# Patient Record
Sex: FEMALE | Race: WHITE
[De-identification: names, ages, dates, MRNs, and addresses within clinical notes are randomized per-mention and may not be internally consistent; named-entity substitution may affect disease eponyms.]

---

## 2022-07-10 ENCOUNTER — HOSPITAL ENCOUNTER (EMERGENCY)
Dept: HOSPITAL 97 - ER | Age: 52
Discharge: HOME | End: 2022-07-10
Payer: COMMERCIAL

## 2022-07-10 VITALS — TEMPERATURE: 98.3 F

## 2022-07-10 VITALS — DIASTOLIC BLOOD PRESSURE: 63 MMHG | OXYGEN SATURATION: 97 % | SYSTOLIC BLOOD PRESSURE: 114 MMHG

## 2022-07-10 DIAGNOSIS — S91.331A: Primary | ICD-10-CM

## 2022-07-10 DIAGNOSIS — Z88.2: ICD-10-CM
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
## 2022-07-10 LAB
ALBUMIN SERPL BCP-MCNC: 3.7 G/DL (ref 3.4–5)
ALP SERPL-CCNC: 120 U/L (ref 45–117)
ALT SERPL W P-5'-P-CCNC: 30 U/L (ref 12–78)
AST SERPL W P-5'-P-CCNC: 19 U/L (ref 15–37)
BUN BLD-MCNC: 15 MG/DL (ref 7–18)
GLUCOSE SERPLBLD-MCNC: 121 MG/DL (ref 74–106)
HCT VFR BLD CALC: 36.1 % (ref 36–45)
LYMPHOCYTES # SPEC AUTO: 1.5 K/UL (ref 0.7–4.9)
MCV RBC: 78.7 FL (ref 80–100)
PMV BLD: 7.7 FL (ref 7.6–11.3)
POTASSIUM SERPL-SCNC: 3.7 MMOL/L (ref 3.5–5.1)
RBC # BLD: 4.59 M/UL (ref 3.86–4.86)

## 2022-07-10 PROCEDURE — 99284 EMERGENCY DEPT VISIT MOD MDM: CPT

## 2022-07-10 PROCEDURE — 85025 COMPLETE CBC W/AUTO DIFF WBC: CPT

## 2022-07-10 PROCEDURE — 80053 COMPREHEN METABOLIC PANEL: CPT

## 2022-07-10 PROCEDURE — 96361 HYDRATE IV INFUSION ADD-ON: CPT

## 2022-07-10 PROCEDURE — 73630 X-RAY EXAM OF FOOT: CPT

## 2022-07-10 PROCEDURE — 96374 THER/PROPH/DIAG INJ IV PUSH: CPT

## 2022-07-10 PROCEDURE — 36415 COLL VENOUS BLD VENIPUNCTURE: CPT

## 2022-07-10 PROCEDURE — 90714 TD VACC NO PRESV 7 YRS+ IM: CPT

## 2022-07-10 PROCEDURE — 96375 TX/PRO/DX INJ NEW DRUG ADDON: CPT

## 2022-07-10 NOTE — RAD REPORT
EXAM DESCRIPTION:  RAD - Foot Right 3 View - 7/10/2022 9:43 pm

 

CLINICAL HISTORY:  PAIN

 

COMPARISON:  No comparisons

 

FINDINGS:  No fracture, dislocation or foreign body. Large plantar calcaneal spur.

## 2022-07-10 NOTE — ER
Nurse's Notes                                                                                     

 [facility]                                                                 

Name: Braxton Jain                                                                             

Age: 52 yrs                                                                                       

Sex: Female                                                                                       

: 1970                                                                                   

MRN: [id number]                                                                                   

Arrival Date: 07/10/2022                                                                          

Time: 21:04                                                                                       

Account#: [id number]                                                                            

Bed 23                                                                                            

Private MD:                                                                                       

Diagnosis: Puncture wound without foreign body, right foot-Sting Ray                              

                                                                                                  

Presentation:                                                                                     

07/10                                                                                             

21:31 Chief complaint: Patient states: PATIENT REPORTS BEING STUNG BY A STING RAY ON RIGHT    1 

      LATERAL FOOT AROUND 630 THIS EVENING. Coronavirus screen: Vaccine status: Patient           

      reports receiving the 2nd dose of the covid vaccine. Client denies travel out of the        

      [place] in the last 14 days. At this time, the client does not indicate any symptoms           

      associated with coronavirus-19. Ebola Screen: Patient negative for fever greater than       

      or equal to 101.5 degrees Fahrenheit, and additional compatible Ebola Virus Disease         

      symptoms. Initial Sepsis Screen: Does the patient meet any 2 criteria? HR > 90 bpm. No.     

      Patient's initial sepsis screen is negative. Does the patient have a suspected source       

      of infection? No. Patient's initial sepsis screen is negative.                              

21:31 Method Of Arrival: Wheelchair                                                           [facility] 

21:31 Risk Assessment: Do you want to hurt yourself or someone else? Patient reports no       [facility] 

      desire to harm self or others. Onset of symptoms was July 10, 2022 at 18:30.                

21:31 Acuity: IVÁN 3                                                                           [facility] 

                                                                                                  

Triage Assessment:                                                                                

21:33 General: Appears in no apparent distress. uncomfortable, Behavior is calm, cooperative, [facility] 

      appropriate for age. Pain: Complains of pain in right foot.                                 

                                                                                                  

OB/GYN:                                                                                           

21:33 LMP N/A - Post-menopause                                                                [facility] 

                                                                                                  

Historical:                                                                                       

- Allergies:                                                                                      

21:33 Sulfa (Sulfonamide Antibiotics);                                                        [facility] 

- Home Meds:                                                                                      

21:33 Myrbetriq 50 mg oral Tb24 1 tab once daily [Active];                                    [facility] 

- PMHx:                                                                                           

21:33 None;                                                                                   [facility] 

- PSHx:                                                                                           

21:33 Tonsillectomy; Cholecystectomy; CYST REMOVAL FROM OVARY; GASTRIC BYPASS;                [facility] 

                                                                                                  

- Immunization history:: Adult Immunizations up to date, Last tetanus immunization: up            

  to date.                                                                                        

- Social history:: Smoking status: Patient denies any tobacco usage or history of.                

- Family history:: not pertinent.                                                                 

                                                                                                  

                                                                                                  

Screenin:35 Abuse screen: Denies threats or abuse. Nutritional screening: No deficits noted.        [facility] 

      Tuberculosis screening: No symptoms or risk factors identified. Fall Risk None              

      identified.                                                                                 

                                                                                                  

Assessment:                                                                                       

21:35 Reassessment: No changes from previously documented assessment.                         [facility] 

                                                                                                  

Vital Signs:                                                                                      

21:31  / 76; Pulse 93; Resp 20; Temp 98.3(O); Pulse Ox 99% on R/A; Weight 140.61 kg;    [facility] 

      Height 5 ft. 4 in. (162.56 cm); Pain 10/10;                                                 

22:48  / 63; Pulse 88; Resp 20; Pulse Ox 97% on R/A;                                    1 

21:31 Body Mass Index 53.21 (140.61 kg, 162.56 cm)                                            [facility] 

                                                                                                  

ED Course:                                                                                        

21:04 Patient arrived in ED.                                                                  as  

21:13 Nick Lindsay MD is Attending Physician.                                             [facility] 

21:13 Shahnaz Archibald, RN is Primary Nurse.                                                    [facility] 

21:30 Comprehensive Metabolic Panel Sent.                                                     [facility] 

21:30 CBC with Diff Sent.                                                                     [facility] 

21:33 Triage completed.                                                                       [facility] 

21:33 Arm band placed on right wrist.                                                         [facility] 

21:35 No apparent distress. Awaiting lab results, Awaiting radiology results. Awaiting ED     [facility] 

      provider evaluation.                                                                        

21:35 No provider procedures requiring assistance completed. Inserted saline lock: 20 gauge   bh1 

      in right antecubital area, using aseptic technique. Blood collected.                        

21:35 Patient has correct armband on for positive identification. Placed in gown. Bed in low  bh1 

      position. Call light in reach. Side rails up X 1. Pulse ox on. NIBP on.                     

21:44 Foot Right 3 View XRAY In Process Unspecified.                                          [facility]

22:48 No apparent distress. Resting quietly. Awaiting disposition.                            [facility] 

22:50 Devan Brown MD is Referral Physician.                                               [facility] 

23:07 IV discontinued, intact, bleeding controlled, No redness/swelling at site.              [facility] 

                                                                                                  

Administered Medications:                                                                         

21:29 Drug: NS 0.9% 1000 ml Route: IV; Rate: 1 bolus; Site: left antecubital;                 [facility] 

23:07 Follow up: IV Status: Completed infusion; IV Intake: 1000ml                             [facility] 

21:30 Not Given (Patient Refused; had in 

      Provide Vaccine Information Statement (VIS).                                                

21:30 Drug: morphine 4 mg Route: IVP; Infused Over: 4 mins; Site: right antecubital;          [facility] 

21:46 Follow up: Response: No change in condition                                             [facility] 

21:30 Drug: Zofran (Ondansetron) 4 mg Route: IVP; Site: right antecubital;                    [facility] 

21:46 Follow up: Response: No adverse reaction                                                [facility] 

21:30 Drug: Doxycycline 200 mg Route: PO;                                                     [facility] 

21:47 Follow up: Response: No adverse reaction                                                [facility] 

                                                                                                  

                                                                                                  

Medication:                                                                                       

21:35 VIS not applicable for this client.                                                     [facility] 

                                                                                                  

Intake:                                                                                           

23:07 IV: 1000ml; Total: 1000ml.                                                              [facility] 

                                                                                                  

Outcome:                                                                                          

22:48 Discharged to home ambulatory.                                                          [facility] 

22:52 Discharge ordered by MD. murphy 

23:06 Condition: good                                                                         [facility] 

23:06 Discharge instructions given to patient, family, Instructed on discharge instructions,      

      follow up and referral plans. medication usage, Demonstrated understanding of               

      instructions, follow-up care, medications, Prescriptions given X 2.                         

23:07 Patient left the ED.                                                                    [facility] 

                                                                                                  

Signatures:                                                                                       

Dispatcher [employer]                           Nick Lozano MD MD cha Martinez, Amelia as Hicks, Barbara, RN                      RN   [facility]                                                  

                                                                                                  

**************************************************************************************************

## 2022-07-10 NOTE — EDPHYS
Physician Documentation                                                                           

 [facility]                                                                 

Name: Braxton Jain                                                                             

Age: 52 yrs                                                                                       

Sex: Female                                                                                       

: 1970                                                                                   

MRN: [id number]                                                                                   

Arrival Date: 07/10/2022                                                                          

Time: 21:04                                                                                       

Account#: [id number]                                                                            

Bed 23                                                                                            

Private MD:                                                                                       

ED Physician Nick Lindsay                                                                      

HPI:                                                                                              

07/10                                                                                             

22:45 This 52 yrs old  Female presents to ER via Wheelchair with complaints of       katherine 

      Puncture Wound To Foot - stingray.                                                          

22:45 The patient presents with decreased range of motion, pain, that is acute. The           katherine 

      complaints affect the right foot. Context: [place]          

      resulted from sting ray. Onset: The symptoms/episode began/occurred just prior to           

      arrival. Modifying factors: The symptoms are alleviated by the symptoms are aggravated      

      by nothing. Associated signs and symptoms: Pertinent positives: swelling. Severity of       

      symptoms: At their worst the symptoms were moderate, in the emergency department the        

      symptoms are unchanged. The patient has not experienced similar symptoms in the past.       

                                                                                                  

OB/GYN:                                                                                           

21:33 LMP N/A - Post-menopause                                                                [facility] 

                                                                                                  

Historical:                                                                                       

- Allergies:                                                                                      

21:33 Sulfa (Sulfonamide Antibiotics);                                                        [facility] 

- Home Meds:                                                                                      

21:33 Myrbetriq 50 mg oral Tb24 1 tab once daily [Active];                                    [facility] 

- PMHx:                                                                                           

21:33 None;                                                                                   [facility] 

- PSHx:                                                                                           

21:33 Tonsillectomy; Cholecystectomy; CYST REMOVAL FROM OVARY; GASTRIC BYPASS;                [facility] 

                                                                                                  

- Immunization history:: Adult Immunizations up to date, Last tetanus immunization: up            

  to date.                                                                                        

- Social history:: Smoking status: Patient denies any tobacco usage or history of.                

- Family history:: not pertinent.                                                                 

                                                                                                  

                                                                                                  

ROS:                                                                                              

22:45 Constitutional: Negative for fever, chills, and weight loss, Eyes: Negative for injury, katherine 

      pain, redness, and discharge, ENT: Negative for injury, pain, and discharge, Neck:          

      Negative for injury, pain, and swelling, Cardiovascular: Negative for chest pain,           

      palpitations, and edema, Respiratory: Negative for shortness of breath, cough,              

      wheezing, and pleuritic chest pain, Abdomen/GI: Negative for abdominal pain, nausea,        

      vomiting, diarrhea, and constipation, Back: Negative for injury and pain, : Negative      

      for injury, bleeding, discharge, and swelling, Skin: Negative for injury, rash, and         

      discoloration, Neuro: Negative for headache, weakness, numbness, tingling, and seizure,     

      Psych: Negative for depression, anxiety, suicide ideation, homicidal ideation, and          

      hallucinations, Allergy/Immunology: Negative for hives, rash, and allergies, Endocrine:     

      Negative for neck swelling, polydipsia, polyuria, polyphagia, and marked weight             

      changes, Hematologic/Lymphatic: Negative for swollen nodes, abnormal bleeding, and          

      unusual bruising.                                                                           

22:45 MS/extremity: Positive for erythema, pain, swelling, tenderness, of the lateral side of     

      right foot and dorsum of right foot.                                                        

                                                                                                  

Exam:                                                                                             

22:45 Constitutional:  This is a well developed, well nourished patient who is awake, alert,  katherine 

      and in no acute distress. Head/Face:  Normocephalic, atraumatic. Eyes:  Pupils equal        

      round and reactive to light, extra-ocular motions intact.  Lids and lashes normal.          

      Conjunctiva and sclera are non-icteric and not injected.  Cornea within normal limits.      

      Periorbital areas with no swelling, redness, or edema. ENT:  Nares patent. No nasal         

      discharge, no septal abnormalities noted.  Tympanic membranes are normal and external       

      auditory canals are clear.  Oropharynx with no redness, swelling, or masses, exudates,      

      or evidence of obstruction, uvula midline.  Mucous membranes moist. Neck:  Trachea          

      midline, no thyromegaly or masses palpated, and no cervical lymphadenopathy.  Supple,       

      full range of motion without nuchal rigidity, or vertebral point tenderness.  No            

      Meningismus. Chest/axilla:  Normal chest wall appearance and motion.  Nontender with no     

      deformity.  No lesions are appreciated. Cardiovascular:  Regular rate and rhythm with a     

      normal S1 and S2.  No gallops, murmurs, or rubs.  Normal PMI, no JVD.  No pulse             

      deficits. Respiratory:  Lungs have equal breath sounds bilaterally, clear to                

      auscultation and percussion.  No rales, rhonchi or wheezes noted.  No increased work of     

      breathing, no retractions or nasal flaring. Abdomen/GI:  Soft, non-tender, with normal      

      bowel sounds.  No distension or tympany.  No guarding or rebound.  No evidence of           

      tenderness throughout. Back:  No spinal tenderness.  No costovertebral tenderness.          

      Full range of motion. Neuro:  Awake and alert, GCS 15, oriented to person, place, time,     

      and situation.  Cranial nerves II-XII grossly intact.  Motor strength 5/5 in all            

      extremities.  Sensory grossly intact.  Cerebellar exam normal.  Normal gait. Psych:         

      Awake, alert, with orientation to person, place and time.  Behavior, mood, and affect       

      are within normal limits.                                                                   

22:45 Skin: induration, that is mild is noted, injury, puncture(s), that are deep, of the         

      right foot.                                                                                 

                                                                                                  

Vital Signs:                                                                                      

21:31  / 76; Pulse 93; Resp 20; Temp 98.3(O); Pulse Ox 99% on R/A; Weight 140.61 kg;    1 

      Height 5 ft. 4 in. (162.56 cm); Pain 10/10;                                                 

22:48  / 63; Pulse 88; Resp 20; Pulse Ox 97% on R/A;                                    bh1 

21:31 Body Mass Index 53.21 (140.61 kg, 162.56 cm)                                            [facility] 

                                                                                                  

MDM:                                                                                              

21:13 Patient medically screened.                                                             [facility] 

22:48 Differential diagnosis: penetrating trauma. Data reviewed: vital signs, nurses notes.   [facility] 

      Data interpreted: Cardiac monitor: rate is 88 beats/min, rhythm is regular, Pulse           

      oximetry: on room air is 97 %. Test interpretation: by ED physician or midlevel             

      provider: plain radiologic studies. Counseling: I had a detailed discussion with the        

      patient and/or guardian regarding: the historical points, exam findings, and any            

      diagnostic results supporting the discharge/admit diagnosis, lab results, radiology         

      results, the need for outpatient follow up, for definitive care, a family practitioner,     

      a general surgeon.                                                                          

                                                                                                  

07/10                                                                                             

21:15 Order name: CBC with Diff; Complete Time: 22:45                                         [facility] 

07/10                                                                                             

21:15 Order name: Comprehensive Metabolic Panel; Complete Time: 22:45                         [facility] 

07/10                                                                                             

21:15 Order name: Foot Right 3 View XRAY; Complete Time: 22:45                                [facility] 

07/10                                                                                             

21:15 Order name: Misc. Order: hot water soaks/tib; Complete Time: 21:30                      [facility] 

                                                                                                  

Administered Medications:                                                                         

21:29 Drug: NS 0.9% 1000 ml Route: IV; Rate: 1 bolus; Site: left antecubital;                 1 

23:07 Follow up: IV Status: Completed infusion; IV Intake: 1000ml                             [facility] 

21:30 Not Given (Patient Refused; had in 

      Provide Vaccine Information Statement (VIS).                                                

21:30 Drug: morphine 4 mg Route: IVP; Infused Over: 4 mins; Site: right antecubital;          1 

21:46 Follow up: Response: No change in condition                                             [facility] 

21:30 Drug: Zofran (Ondansetron) 4 mg Route: IVP; Site: right antecubital;                    1 

21:46 Follow up: Response: No adverse reaction                                                [facility] 

21:30 Drug: Doxycycline 200 mg Route: PO;                                                     1 

21:47 Follow up: Response: No adverse reaction                                                [facility] 

                                                                                                  

                                                                                                  

Disposition Summary:                                                                              

07/10/22 22:52                                                                                    

Discharge Ordered                                                                                 

      Location: Home                                                                          [facility] 

      Problem: new                                                                            katherine 

      Symptoms: have improved                                                                 katherine 

      Condition: Stable                                                                       katherine 

      Diagnosis                                                                                   

        - Puncture wound without foreign body, right foot - Sting Ray                         katherine 

      Followup:                                                                               katherine 

        - With: Private Physician                                                                  

        - When: 2 - 3 days                                                                         

        - Reason: Recheck today's complaints, Continuance of care, Re-evaluation by your           

      physician                                                                                   

      Followup:                                                                               katherine 

        - With: Devan Brown MD                                                                 

        - When: 2 - 3 days                                                                         

        - Reason: Recheck today's complaints, Re-evaluation by your physician                      

      Discharge Instructions:                                                                     

        - Discharge Summary Sheet                                                             [facility] 

        - [employer] Injury                                                                  katherine 

        - Puncture Wound                                                                      katherine 

        - Puncture Wound, Easy-to-Read                                                        katherine 

        - [employer] Injury, Easy-to-Read                                                    [facility] 

      Forms:                                                                                      

        - Medication Reconciliation Form                                                      [facility] 

        - Thank You Letter                                                                    [facility] 

        - Antibiotic Education                                                                [facility] 

        - Prescription Opioid Use                                                             [facility] 

      Prescriptions:                                                                              

        - Doxycycline Hyclate 100 mg Oral Tablet                                                   

            - take 1 tablet by ORAL route every 12 hours; 20 tablet; Refills: 0, Product      [facility] 

      Selection Permitted                                                                         

        - Tylenol-Codeine #3 300 mg-30 mg Oral                                                     

            - take 2 tablet by ORAL route every 6 hours; 24 tablet; Refills: 0, Product       [facility] 

      Selection Permitted                                                                         

Signatures:                                                                                       

Dispatcher [employer]                           Nick Lozano MD MD cha Hicks, Barbara, RN                      RN   [facility]                                                  

                                                                                                  

**************************************************************************************************